# Patient Record
Sex: FEMALE | Race: WHITE
[De-identification: names, ages, dates, MRNs, and addresses within clinical notes are randomized per-mention and may not be internally consistent; named-entity substitution may affect disease eponyms.]

---

## 2022-05-26 ENCOUNTER — APPOINTMENT (OUTPATIENT)
Dept: NEUROSURGERY | Facility: CLINIC | Age: 77
End: 2022-05-26
Payer: MEDICARE

## 2022-05-26 ENCOUNTER — NON-APPOINTMENT (OUTPATIENT)
Age: 77
End: 2022-05-26

## 2022-05-26 VITALS
SYSTOLIC BLOOD PRESSURE: 138 MMHG | HEART RATE: 88 BPM | RESPIRATION RATE: 18 BRPM | DIASTOLIC BLOOD PRESSURE: 78 MMHG | BODY MASS INDEX: 22.98 KG/M2 | WEIGHT: 143 LBS | OXYGEN SATURATION: 98 % | HEIGHT: 66 IN

## 2022-05-26 DIAGNOSIS — Z86.39 PERSONAL HISTORY OF OTHER ENDOCRINE, NUTRITIONAL AND METABOLIC DISEASE: ICD-10-CM

## 2022-05-26 DIAGNOSIS — Z78.9 OTHER SPECIFIED HEALTH STATUS: ICD-10-CM

## 2022-05-26 DIAGNOSIS — F41.9 ANXIETY DISORDER, UNSPECIFIED: ICD-10-CM

## 2022-05-26 DIAGNOSIS — Z87.39 PERSONAL HISTORY OF OTHER DISEASES OF THE MUSCULOSKELETAL SYSTEM AND CONNECTIVE TISSUE: ICD-10-CM

## 2022-05-26 DIAGNOSIS — F32.A ANXIETY DISORDER, UNSPECIFIED: ICD-10-CM

## 2022-05-26 DIAGNOSIS — Z87.81 PERSONAL HISTORY OF (HEALED) TRAUMATIC FRACTURE: ICD-10-CM

## 2022-05-26 DIAGNOSIS — R20.0 ANESTHESIA OF SKIN: ICD-10-CM

## 2022-05-26 DIAGNOSIS — Z87.898 PERSONAL HISTORY OF OTHER SPECIFIED CONDITIONS: ICD-10-CM

## 2022-05-26 DIAGNOSIS — G57.12 MERALGIA PARESTHETICA, LEFT LOWER LIMB: ICD-10-CM

## 2022-05-26 DIAGNOSIS — Z83.3 FAMILY HISTORY OF DIABETES MELLITUS: ICD-10-CM

## 2022-05-26 PROCEDURE — 99202 OFFICE O/P NEW SF 15 MIN: CPT

## 2022-05-26 RX ORDER — DEXTROAMPHETAMINE SACCHARATE, AMPHETAMINE ASPARTATE, DEXTROAMPHETAMINE SULFATE, AND AMPHETAMINE SULFATE 2.5; 2.5; 2.5; 2.5 MG/1; MG/1; MG/1; MG/1
10 TABLET ORAL
Refills: 0 | Status: ACTIVE | COMMUNITY

## 2022-05-26 RX ORDER — PSYLLIUM HUSK 0.4 G
CAPSULE ORAL
Refills: 0 | Status: ACTIVE | COMMUNITY

## 2022-05-26 RX ORDER — OXYCODONE HYDROCHLORIDE AND ACETAMINOPHEN 10; 325 MG/1; MG/1
10-325 TABLET ORAL
Refills: 0 | Status: ACTIVE | COMMUNITY

## 2022-05-26 RX ORDER — CLONAZEPAM 0.5 MG/1
0.5 TABLET ORAL
Refills: 0 | Status: ACTIVE | COMMUNITY

## 2022-05-26 RX ORDER — TURMERIC/TURMERIC EXT/PEPR EXT 900-100 MG
CAPSULE ORAL
Refills: 0 | Status: ACTIVE | COMMUNITY

## 2022-05-27 NOTE — REVIEW OF SYSTEMS
[Numbness] : numbness [Tingling] : tingling [Difficulty Walking] : difficulty walking [Joint Pain] : joint pain [Joint Stiffness] : joint stiffness [Limb Pain] : limb pain [Negative] : Heme/Lymph

## 2022-05-31 NOTE — HISTORY OF PRESENT ILLNESS
[de-identified] : 75 yo  RIGHT handed FEMALE who comes to be evaluated for  lower back and leg pain accompanied by numbness.\par Reports the onset of lower back and LEFT buttock/thigh pain onset about three (3) years ago. Symptoms were thought to be due to a HIP pathology so she underwent a hip replacement   but this did not relief the pain of  the left thigh/buttocks infact she thinks this has worsened.\par \par She continued pain management modalities but symptoms persist (Della Rae  [address] [address] [phone])\par \par Reports she lives in [facility] for 6 months out of the year and recently she   began noticing numbness of the  LEFT thigh images were completed there and she was told this was due to a pinched nerve in her lower back she brings those images for review.\par \par Treatments:\par supervised physical therapy with no results\par OTC Tylenol\par Percocet\par \par \par Report Difficulty climbing stairs 2/2 subjective weakness of leg/thigh\par Denies bowel/bladder dysfunction\par \par \par  Statement Selected

## 2022-05-31 NOTE — CONSULT LETTER
[FreeTextEntry2] : Zia Figueredo MD\par [address] [place] [address]\par [place] [id number]

## 2022-05-31 NOTE — ADDENDUM
[FreeTextEntry1] : PATIENT:	Maria De Jesus Waite 				\par \par \par Thursday, May 26, 2022\par \par I saw Ms. Waite in consultation today. She is a 76-year-old female, status post hip surgery in the past approximately three years ago, who now has significant numbness of her left thigh. In reviewing her case. I do think she is suffering from neuralgia paresthetica, and I recommend that she see Drake Mullen for evaluation with EMG and nerve conduction studies. Once she obtains these we will have a better idea of what her neurologic deficit emanates from. Her spine MRI was benign, with no evidence of canal or neuroforaminal compromise.\par \par \par \par \par \par \par Lon Osorio MD\par \par DL[facility] #[id number]\par \par \par \par

## 2022-05-31 NOTE — PHYSICAL EXAM
[General Appearance - Alert] : alert [General Appearance - Well Nourished] : well nourished [General Appearance - Well-Appearing] : healthy appearing [Oriented To Time, Place, And Person] : oriented to person, place, and time [Person] : oriented to person [Place] : oriented to place [Time] : oriented to time [5] : S1 toe walking 5/5 [2+] : Ankle jerk left 2+ [No Visual Abnormalities] : no visible abnormailities [Normal] : normal [Able to toe walk] : the patient was able to toe walk [Able to heel walk] : the patient was able to heel walk [Sclera] : the sclera and conjunctiva were normal [Outer Ear] : the ears and nose were normal in appearance [Examination Of The Oral Cavity] : the lips and gums were normal [Neck Appearance] : the appearance of the neck was normal [] : no respiratory distress [Exaggerated Use Of Accessory Muscles For Inspiration] : no accessory muscle use [No Spinal Tenderness] : no spinal tenderness [Abnormal Walk] : normal gait [Nail Clubbing] : no clubbing  or cyanosis of the fingernails [Skin Color & Pigmentation] : normal skin color and pigmentation [Straight-Leg Raise Test - Left] : straight leg raise of the left leg was negative [Straight-Leg Raise Test - Right] : straight leg raise  of the right leg was negative

## 2022-05-31 NOTE — PLAN
[FreeTextEntry1] : Dr Osorio  reviewed imaging results with patient in terms he was able to comprehend with time allowed for questions NO surgical intervention \par  NO obvious signs of structural or compressive lesions that need surgical intervention or could be directly related to patients symptoms.\par \par Patient counselled regarding  worsening  or new symptoms and if these should occurs , patient is aware  to contact the office for reevaluation,\par \par \par Will refer to neurology for EMG/NCV\par Okay to continue conservative measures at this time

## 2022-08-25 ENCOUNTER — APPOINTMENT (OUTPATIENT)
Dept: NEUROLOGY | Facility: CLINIC | Age: 77
End: 2022-08-25

## 2022-08-25 VITALS
OXYGEN SATURATION: 95 % | BODY MASS INDEX: 22.02 KG/M2 | SYSTOLIC BLOOD PRESSURE: 134 MMHG | HEIGHT: 66 IN | WEIGHT: 137 LBS | HEART RATE: 89 BPM | DIASTOLIC BLOOD PRESSURE: 84 MMHG | TEMPERATURE: 97.8 F

## 2022-08-25 DIAGNOSIS — M79.605 PAIN IN LEFT LEG: ICD-10-CM

## 2022-08-25 DIAGNOSIS — M51.26 OTHER INTERVERTEBRAL DISC DISPLACEMENT, LUMBAR REGION: ICD-10-CM

## 2022-08-25 DIAGNOSIS — R26.2 DIFFICULTY IN WALKING, NOT ELSEWHERE CLASSIFIED: ICD-10-CM

## 2022-08-25 PROCEDURE — 99203 OFFICE O/P NEW LOW 30 MIN: CPT | Mod: 25

## 2022-08-25 PROCEDURE — 95886 MUSC TEST DONE W/N TEST COMP: CPT

## 2022-08-25 PROCEDURE — 95910 NRV CNDJ TEST 7-8 STUDIES: CPT

## 2022-08-25 RX ORDER — GABAPENTIN 300 MG/1
300 CAPSULE ORAL
Refills: 0 | Status: DISCONTINUED | COMMUNITY
End: 2022-08-25

## 2022-08-25 RX ORDER — UBIQUINOL 100 MG
CAPSULE ORAL
Refills: 0 | Status: DISCONTINUED | COMMUNITY
End: 2022-08-25

## 2022-08-25 NOTE — PROCEDURE
[FreeTextEntry1] : \par Nerve Conduction and Electromyography Report\par  [FreeTextEntry3] : \par Electro Physiologic Findings:\par \par Limb temperature was monitored and maintained at approximately 30 – 34° C in the lower extremities. \par \par The right superficial fibular sensory amplitude was mildly low with normal velocity; on the left it was normal. The lateral femoral cutaneous sensory responses were present bilaterally and symmetric. The right fibular motor response was within normal limits, although the amplitude was about 50% that of the contralateral side. The left fibular and bilateral tibial motor responses were normal. The tibial and fibular F-wave latencies were normal bilaterally and symmetric. \par \par Needle electromyography was performed on select left lower extremity L2-L4 appendicular muscles and the left L2/3 and L3/4 paraspinals. All muscles tested were normal without evidence of active or chronic denervation. \par \par Clinical Electrophysiological Impression: \par \par This was an unremarkable electrodiagnostic study of the lower extremities. There was no evidence of polyneuropathy, lateral femoral cutaneous neuropathy, or left upper to mid lumbar radiculopathy. \par \par There was a suggestion of a mild right common fibular (peroneal) neuropathy, although she has no symptoms or exam findings consistent with that.

## 2022-08-25 NOTE — CONSULT LETTER
[Dear  ___] : Dear  [unfilled], [Consult Letter:] : I had the pleasure of evaluating your patient, [unfilled]. [Please see my note below.] : Please see my note below. [Consult Closing:] : Thank you very much for allowing me to participate in the care of this patient.  If you have any questions, please do not hesitate to contact me. [Sincerely,] : Sincerely, [FreeTextEntry3] : Drake Mullen M.D.\par Neurology, Electromyography and Neuromuscular Medicine\par [facility]\par \par  of Neurology\par [facility] [employer]

## 2022-08-25 NOTE — HISTORY OF PRESENT ILLNESS
[FreeTextEntry1] : Referred by Dr. Osorio for left thigh pain \par She describes pain in the left upper thigh and groin, worst when she first gets up to stand / walk \par She also complains of numbness in the left anterior thigh - although on further questioning it feels normal to touch, but feels "stiff" and "hard"\par She broke her hip several years ago, initially treated conservatively, then had a rip replacement about 3 years ago\par For the first 3 months pain improved, then started having left groin pain again, worse than before the surgery \par She denies pain in the back or shooting pain into the legs\par \par \par Reviewed:\par Note from Dr. Osorio / neurosurgery\par MRI L hip - s/p left hip arthroplasty, no evidence of sacroiliitis or avascular necrosis \par MRI L spine - multilevel degenerative disc disease worst at L3/4 with R>L lateral recess narrowing and mild to moderate b/l foraminal narrowing

## 2022-08-25 NOTE — ASSESSMENT
[FreeTextEntry1] : Although she states she has numbness in the left anterior thigh, there is no loss of sensation on exam, and on further questioning she describes "stiffness" \par No evidence of lateral femoral cutaneous neuropathy or left upper to mid lumbar radiculopathy seen on EMG, and recent imaging did not show neurocompressive lesion \par Symptoms are likely musculoskeletal in etiology\par She will seek 2nd orthopedic opinion\par \par See separate procedure note for full results of NCS/EMG study

## 2022-08-25 NOTE — CONSULT LETTER
[Dear  ___] : Dear  [unfilled], [Consult Letter:] : I had the pleasure of evaluating your patient, [unfilled]. [Please see my note below.] : Please see my note below. [Consult Closing:] : Thank you very much for allowing me to participate in the care of this patient.  If you have any questions, please do not hesitate to contact me. [Sincerely,] : Sincerely, [FreeTextEntry3] : Drake Mullen M.D.\par Neurology, Electromyography and Neuromuscular Medicine\par [facility]\par \par  of Neurology\par [facility] [employer]

## 2022-08-25 NOTE — PHYSICAL EXAM
[FreeTextEntry1] : Motor: 5/5 strength throughout\par Sensory: LT/PP intact in LE b/l\par Reflexes: 2+ symmetric throughout\par Gait: normal\par MSK: JOHN + on the left, FAIR negative; right side both negative

## 2022-08-31 ENCOUNTER — TRANSCRIPTION ENCOUNTER (OUTPATIENT)
Age: 77
End: 2022-08-31

## 2022-09-09 ENCOUNTER — TRANSCRIPTION ENCOUNTER (OUTPATIENT)
Age: 77
End: 2022-09-09

## 2023-08-13 ENCOUNTER — NON-APPOINTMENT (OUTPATIENT)
Age: 78
End: 2023-08-13